# Patient Record
Sex: FEMALE | Race: WHITE | NOT HISPANIC OR LATINO | Employment: FULL TIME | URBAN - METROPOLITAN AREA
[De-identification: names, ages, dates, MRNs, and addresses within clinical notes are randomized per-mention and may not be internally consistent; named-entity substitution may affect disease eponyms.]

---

## 2019-08-16 ENCOUNTER — HOSPITAL ENCOUNTER (EMERGENCY)
Facility: HOSPITAL | Age: 24
Discharge: HOME | End: 2019-08-16
Attending: FAMILY MEDICINE
Payer: COMMERCIAL

## 2019-08-16 VITALS
BODY MASS INDEX: 28.25 KG/M2 | DIASTOLIC BLOOD PRESSURE: 65 MMHG | SYSTOLIC BLOOD PRESSURE: 103 MMHG | OXYGEN SATURATION: 98 % | HEIGHT: 67 IN | HEART RATE: 92 BPM | TEMPERATURE: 99 F | RESPIRATION RATE: 18 BRPM | WEIGHT: 180 LBS

## 2019-08-16 DIAGNOSIS — M25.561 KNEE PAIN, RIGHT: ICD-10-CM

## 2019-08-16 DIAGNOSIS — S83.411A SPRAIN OF MEDIAL COLLATERAL LIGAMENT OF RIGHT KNEE, INITIAL ENCOUNTER: Primary | ICD-10-CM

## 2019-08-16 LAB — B-HCG UR QL: NEGATIVE

## 2019-08-16 PROCEDURE — 73562 XR KNEE 3 VIEW RIGHT: ICD-10-PCS | Mod: 26,RT,, | Performed by: RADIOLOGY

## 2019-08-16 PROCEDURE — 63600175 PHARM REV CODE 636 W HCPCS: Performed by: PHYSICIAN ASSISTANT

## 2019-08-16 PROCEDURE — 73562 X-RAY EXAM OF KNEE 3: CPT | Mod: TC,FY,RT

## 2019-08-16 PROCEDURE — 99284 EMERGENCY DEPT VISIT MOD MDM: CPT | Mod: 25

## 2019-08-16 PROCEDURE — 96372 THER/PROPH/DIAG INJ SC/IM: CPT

## 2019-08-16 PROCEDURE — 73562 X-RAY EXAM OF KNEE 3: CPT | Mod: 26,RT,, | Performed by: RADIOLOGY

## 2019-08-16 PROCEDURE — 81025 URINE PREGNANCY TEST: CPT

## 2019-08-16 RX ORDER — DEXTROAMPHETAMINE SACCHARATE, AMPHETAMINE ASPARTATE, DEXTROAMPHETAMINE SULFATE AND AMPHETAMINE SULFATE 7.5; 7.5; 7.5; 7.5 MG/1; MG/1; MG/1; MG/1
30 TABLET ORAL 2 TIMES DAILY
COMMUNITY

## 2019-08-16 RX ORDER — KETOROLAC TROMETHAMINE 30 MG/ML
15 INJECTION, SOLUTION INTRAMUSCULAR; INTRAVENOUS
Status: COMPLETED | OUTPATIENT
Start: 2019-08-16 | End: 2019-08-16

## 2019-08-16 RX ADMIN — KETOROLAC TROMETHAMINE 15 MG: 30 INJECTION, SOLUTION INTRAMUSCULAR at 12:08

## 2019-08-16 NOTE — ED NOTES
Pt d/c instructions reviewed and Pt verbalized understanding.  Pt ambulatory at d/c and escorted to registration.

## 2019-08-16 NOTE — DISCHARGE INSTRUCTIONS
Ice 15 min 3 times a day as needed for pain.  May take over-the-counter Tylenol and/or ibuprofen as needed for pain.    Ace wrap as needed for pain and swelling.    May bear weight as pain allows.    No sports activities or high-intensity activity for the next 7 days.

## 2019-08-16 NOTE — ED PROVIDER NOTES
Encounter Date: 8/16/2019       History     Chief Complaint   Patient presents with    Knee Pain     right knee pain denies trauma     Patient presents to the ER with complaint of right knee pain. Patient states was getting out of bed when she stepped down and felt a sharp pain to the anterior medial side of her right knee.  Patient denies having any prior injury to this knee states pain does not radiate is sharp worse with weight-bearing.  Patient denies any fever nausea vomiting diarrhea chest pain trouble breathing shortness of breath denies any recent illness.  Patient states took Tylenol prior to arrival denied having taking any NSAID medication.    The history is provided by the patient.     Review of patient's allergies indicates:   Allergen Reactions    Shellfish containing products Rash     Past Medical History:   Diagnosis Date    ADD (attention deficit disorder) without hyperactivity      History reviewed. No pertinent surgical history.  History reviewed. No pertinent family history.  Social History     Tobacco Use    Smoking status: Never Smoker   Substance Use Topics    Alcohol use: Not on file    Drug use: Not on file     Review of Systems   Constitutional: Negative for fever.   HENT: Negative for congestion and sore throat.    Respiratory: Negative for shortness of breath.    Cardiovascular: Negative for chest pain.   Gastrointestinal: Negative for constipation, diarrhea, nausea and vomiting.   Genitourinary: Negative for dysuria.   Musculoskeletal: Positive for arthralgias (Right knee) and gait problem ( limp secondary to pain). Negative for back pain, joint swelling and myalgias.   Skin: Negative for color change, rash and wound.   Neurological: Negative for weakness.   Hematological: Does not bruise/bleed easily.   All other systems reviewed and are negative.      Physical Exam     Initial Vitals [08/16/19 1026]   BP Pulse Resp Temp SpO2   103/65 92 18 98.5 °F (36.9 °C) 98 %      MAP       --          Physical Exam    Nursing note and vitals reviewed.  Constitutional: She appears well-developed and well-nourished. She is not diaphoretic. No distress.   HENT:   Head: Atraumatic.   Eyes: Right eye exhibits no discharge. Left eye exhibits no discharge.   Neck: Normal range of motion.   Cardiovascular: Normal rate, regular rhythm, normal heart sounds and intact distal pulses. Exam reveals no gallop and no friction rub.    No murmur heard.  Pulmonary/Chest: Breath sounds normal. No respiratory distress. She has no wheezes. She has no rhonchi. She has no rales. She exhibits no tenderness.   Musculoskeletal: Normal range of motion. She exhibits tenderness. She exhibits no edema.        Right knee: She exhibits normal range of motion, no swelling, no effusion, no ecchymosis, no deformity, no laceration, no erythema, normal alignment, no LCL laxity, normal patellar mobility, normal meniscus and no MCL laxity. Tenderness found. Medial joint line and LCL tenderness noted. No lateral joint line, no MCL and no patellar tendon tenderness noted.        Left knee: Normal.        Legs:  Neurological: She is alert and oriented to person, place, and time. She has normal strength and normal reflexes. She displays normal reflexes. GCS score is 15. GCS eye subscore is 4. GCS verbal subscore is 5. GCS motor subscore is 6.   Skin: Skin is warm and dry. Capillary refill takes less than 2 seconds. No rash noted. No erythema.   Psychiatric: She has a normal mood and affect. Thought content normal.         ED Course   Procedures  Labs Reviewed - No data to display       Imaging Results    None       X-Rays:   Independently Interpreted Readings:   Other Readings:  I have reviewed the x-ray and agreed radiologist interpretation.    Medical Decision Making:   Differential Diagnosis:   Sprain strain effusion  Clinical Tests:   Radiological Study: Ordered and Reviewed  ED Management:  Discussed with patient plan of care will order knee  x-ray and urine pregnancy if negative urine pregnancy will order dose of Toradol medication discussed this with the patient patient states she understands does not have any questions.    Discussed return to ER precaution with the patient patient stated that she understood did not have any questions.                      Clinical Impression:       ICD-10-CM ICD-9-CM   1. Sprain of medial collateral ligament of right knee, initial encounter S83.411A 844.1   2. Knee pain, right M25.561 719.46                                KRYSTLE Bryatn  08/16/19 1235